# Patient Record
(demographics unavailable — no encounter records)

---

## 2024-10-23 NOTE — HISTORY OF PRESENT ILLNESS
[FreeTextEntry1] : Ms. Ureña is a 58-year-old woman who presents today in neurologic consultation for symptoms that began following a fall on 7/25/24 entering her bathroom after she tripped over the entrance. She fell forward and struck her head on the floor with LOC for undetermined period of time. She had bleeding from a scalp laceration in the frontal region. Patient went to the ED. She had CT scan of the head, but she was not told what it showed. She had steri-strips placed there closing the laceration. Since the accident, patient has had headaches qod for which she takes Tylenol extra strength. She also notes dizziness and nausea but no vomiting. Patient has not had any mental disturbance or fuzzy thinking but she does have occasional insomnia.

## 2024-10-23 NOTE — ASSESSMENT
[FreeTextEntry1] : Impression is that cerebral concussion with loss of consciousness. I ordered an MRI of the brain without contrast to rule out intracranial pathology. We will see her back in follow up when imaging is complete.    Total clinician time spent today on the patient is 45 minutes including preparing to see the patient, obtaining and/or reviewing and confirming history, performing medically necessary and appropriate examination, counseling and educating the patient and/or family, documenting clinical information in the EHR and communicating and/or referring to other healthcare professionals.   Entered by Sonya Mchugh acting as scribe for Dr. Candelaria.   The documentation recorded by the scribe, in my presence, accurately reflects the service I personally performed, and the decisions made by me with my edits as appropriate. Logan Candelaria MD, FAAN, FACP Diplomate [employer].

## 2024-10-23 NOTE — PHYSICAL EXAM
[FreeTextEntry1] :  PHYSICAL EXAMINATION: Head: There is a healed visible linear scar in the frontal region between her 2 eyebrows with a keloid formation.  Neck: Supple with full range of motion; nontender with negative bilateral Spurling's signs.  Spine: Full range of motion; nontender. Negative straight leg raise maneuvers.  Extremities: Non-tender. Atraumatic. Negative Tinel's signs.  NEUROLOGICAL EXAMINATION:  Mental Status: Patient is a good informant with intact orientation, attention, concentration, recent and remote memory. Language evaluation reveals no evidence of aphasia. Fund of knowledge is normal.  Cranial Nerves Cranial Nerves:  II, III, IV, VI: Pupils are equal, round, and reactive to light and accommodation. No evidence of afferent pupillary defect. Visual fields are full to confrontation. Eye movements are full without evidence of nystagmus or internuclear ophthalmoplegia. Funduscopic examination reveals sharp disc margins.  V: Normal jaw movements. Normal facial sensation.  VII: Normal facial motor testing.  VIII: Grossly normal hearing bilaterally.  IX, X: Palate moves symmetrically. No dysarthria.  XI: Normal shoulder shrug and sternocleidomastoid power.  XII: Tongue appears normal and protrudes in the midline.  Motor: Normal bulk, tone, and power throughout.  Muscle Stretch Reflexes (right/left): 2+ symmetrical.  Plantar Responses: Flexor bilaterally.  Coordination: Normal finger to nose and heel to shin testing, no truncal ataxia and no tremor.  Sensation: Normal primary sensation. Normal double simultaneous stimulation.  Gait and Station: Normal base, stride, and turning. Normal toe and heel walking. Normal tandem. Negative Romberg.

## 2024-12-18 NOTE — HISTORY OF PRESENT ILLNESS
[FreeTextEntry1] : Ms. Ureña is a 58-year-old woman who presents today in neurologic follow-up for symptoms that began following a fall on 7/25/24 entering her bathroom after she tripped over the entrance. She fell forward and struck her head on the floor with LOC for undetermined period of time. She had bleeding from a scalp laceration in the frontal region. Patient went to the ED. She had CT scan of the head, but she was not told what it showed. She had steri-strips placed there closing the laceration. Since the accident, patient has had headaches qod for which she takes Tylenol extra strength. She also notes dizziness and nausea but no vomiting. Patient has not had any mental disturbance or fuzzy thinking but she does have occasional insomnia. MRI of the brain was normal, no evidence of hemorrhage or contusion. Patient does have sensitivity in her frontal scalp on the left side where she had a laceration. Symptoms are those of a concussion which will consentaneously improve in the absence of skull fracture or brain contusion.

## 2024-12-18 NOTE — ASSESSMENT
[FreeTextEntry1] : Impression is of brain concussion and the patient will not need to see me again in follow-up.     Entered by Nora Hutton acting as scribe for Dr. Candelaria.     The documentation recorded by the scribe, in my presence, accurately reflects the service I personally performed, and the decisions made by me with my edits as appropriate. Logan Candelaria MD, FAAN, FACP Diplomate [employer].